# Patient Record
(demographics unavailable — no encounter records)

---

## 2025-06-26 NOTE — HISTORY OF PRESENT ILLNESS
[de-identified] : 6/26/25: fell 9 days ago and injured right wrist- told she had a fracture by EBONIE ESPINOZA

## 2025-06-26 NOTE — ASSESSMENT
[FreeTextEntry1] : The fracture was discussed with the patient at length.  We discussed that although there may be some imperfect alignment on XRay, the patient would likely do best with early motion exercises to minimize stiffness.  We discussed that flexion at the fracture site was well tolerated.  We discussed the importance of good function over good Xrays and that performing surgery may lead to unnecessary scar tissue formation.  We discussed the benefit of renetta strapping and early range of motion.  We did discuss the goals of surgery when indicated for malrotation or extreme flexion and what to expect.  The patient may benefit from therapy.  Risks of treatment include, but are not limited to persistent pain, stiffness, fracture displacement, need for future surgery, mal-union, non-union. All patient questions were answered. Patient expressed understanding and would like to proceed with the non operative treatment plan.   The affected finger is noted to be clean and dry.  A renetta loop was applied to the affected finger over the middle phalanx. It was then strapped to the adjacent finger in overlapping fashion. Fit and pressure were assessed as the strapping was applied and stopped when the desired amount of support was achieved

## 2025-06-26 NOTE — IMAGING
[de-identified] : right hand skin intact +swelling and TTP 5th [facility] fingers NVID  xrays- 3 views of the right hand taken today in office: